# Patient Record
Sex: FEMALE | Race: BLACK OR AFRICAN AMERICAN | NOT HISPANIC OR LATINO | URBAN - METROPOLITAN AREA
[De-identification: names, ages, dates, MRNs, and addresses within clinical notes are randomized per-mention and may not be internally consistent; named-entity substitution may affect disease eponyms.]

---

## 2019-12-05 ENCOUNTER — APPOINTMENT (RX ONLY)
Dept: URBAN - METROPOLITAN AREA CLINIC 1 | Facility: CLINIC | Age: 84
Setting detail: DERMATOLOGY
End: 2019-12-05

## 2019-12-05 DIAGNOSIS — L20.89 OTHER ATOPIC DERMATITIS: ICD-10-CM | Status: IMPROVED

## 2019-12-05 DIAGNOSIS — L71.0 PERIORAL DERMATITIS: ICD-10-CM

## 2019-12-05 PROBLEM — L20.84 INTRINSIC (ALLERGIC) ECZEMA: Status: ACTIVE | Noted: 2019-12-05

## 2019-12-05 PROCEDURE — ? COUNSELING

## 2019-12-05 PROCEDURE — ? TREATMENT REGIMEN

## 2019-12-05 PROCEDURE — 99213 OFFICE O/P EST LOW 20 MIN: CPT

## 2019-12-05 ASSESSMENT — LOCATION DETAILED DESCRIPTION DERM
LOCATION DETAILED: LEFT VENTRAL PROXIMAL FOREARM
LOCATION DETAILED: RIGHT INFERIOR VERMILION LIP
LOCATION DETAILED: RIGHT SUPERIOR VERMILION LIP
LOCATION DETAILED: RIGHT VENTRAL PROXIMAL FOREARM

## 2019-12-05 ASSESSMENT — LOCATION ZONE DERM
LOCATION ZONE: ARM
LOCATION ZONE: LIP

## 2019-12-05 ASSESSMENT — LOCATION SIMPLE DESCRIPTION DERM
LOCATION SIMPLE: RIGHT LIP
LOCATION SIMPLE: LEFT FOREARM
LOCATION SIMPLE: RIGHT FOREARM

## 2019-12-05 NOTE — PROCEDURE: TREATMENT REGIMEN
Detail Level: Zone
Plan: Try to avoid wearing Lipstick every single day
Continue Regimen: Pimecrolimus .1% cream QD to lips as maintenance \\nTriamcinolone .1% oint to lips PRN for flares\\nVaseline to lips qd
Continue Regimen: Eurcerin and Vaseline as moisturizer

## 2020-02-20 ENCOUNTER — APPOINTMENT (RX ONLY)
Dept: URBAN - METROPOLITAN AREA CLINIC 1 | Facility: CLINIC | Age: 85
Setting detail: DERMATOLOGY
End: 2020-02-20

## 2020-02-20 DIAGNOSIS — L71.0 PERIORAL DERMATITIS: ICD-10-CM

## 2020-02-20 DIAGNOSIS — L20.89 OTHER ATOPIC DERMATITIS: ICD-10-CM

## 2020-02-20 PROBLEM — L20.84 INTRINSIC (ALLERGIC) ECZEMA: Status: ACTIVE | Noted: 2020-02-20

## 2020-02-20 PROCEDURE — 99213 OFFICE O/P EST LOW 20 MIN: CPT

## 2020-02-20 PROCEDURE — ? TREATMENT REGIMEN

## 2020-02-20 NOTE — PROCEDURE: TREATMENT REGIMEN
Continue Regimen: Elidel around mouth bid
Detail Level: Zone
Modify Regimen: Moisturizer to body two to three times daily